# Patient Record
Sex: FEMALE | URBAN - METROPOLITAN AREA
[De-identification: names, ages, dates, MRNs, and addresses within clinical notes are randomized per-mention and may not be internally consistent; named-entity substitution may affect disease eponyms.]

---

## 2019-07-31 ENCOUNTER — VIRTUAL VISIT (OUTPATIENT)
Dept: FAMILY MEDICINE | Facility: OTHER | Age: 39
End: 2019-07-31

## 2019-07-31 NOTE — PROGRESS NOTES
"Date:   Clinician: Giuliana Hyde  Clinician NPI: [id number]  Patient: Desiree Dow  Patient : 1980  Patient Address: [address] [place] [address] [place] [address]  Patient Phone: [phone]  Visit Protocol: Ear pain  Patient Summary:  Desiree is a 39 year old ( : 1980 ) female who initiated a Visit for swimmer's ear (ear pain). When asked the question \"Please sign me up to receive news, health information and promotions. \", Desiree responded \"No\".    Desiree reports that her ear pain started 5-7 days ago. The ear pain is located outside (external surface) and inside both ears.   In addition to the ear pain, Desiree is experiencing a feeling of fullness and tenderness in the ear(s). Her ear(s) is tender to the touch on the ear canal and helix.   Symptom Details   Pain: Desiree is experiencing severe pain (7-9 on a 10 point pain scale). It gets worse when she gently pulls on the earlobe(s). It does not get worse when she eats or chews.    Desiree denies redness and itchiness in the ear(s). She also denies recent injuries near the ear(s), having fluid draining from the ear(s), feeling feverish, ever having ear tubes, and the possibility of a foreign object in the ear(s). Desiree denies flying within the past week.   Desiree reports swimming within the past week.   Weight: 100 lbs   She denies pregnancy and denies breastfeeding. She has menstruated in the past month.   She does not smoke or use smokeless tobacco.     MEDICATIONS: No current medications, ALLERGIES: NKDA  Clinician Response:  Dear eDsiree,   I am sorry you are not feeling well. To determine the most appropriate care for you, I would like you to be seen in person to further discuss your health history and symptoms.  You will not be charged for this Visit. Thank you for trusting us with your care.   Diagnosis: Refer for additional evaluation  Diagnosis ICD: R69  "